# Patient Record
Sex: FEMALE | Race: WHITE | NOT HISPANIC OR LATINO | Employment: STUDENT | URBAN - METROPOLITAN AREA
[De-identification: names, ages, dates, MRNs, and addresses within clinical notes are randomized per-mention and may not be internally consistent; named-entity substitution may affect disease eponyms.]

---

## 2024-11-21 ENCOUNTER — APPOINTMENT (OUTPATIENT)
Dept: OBSTETRICS AND GYNECOLOGY | Facility: CLINIC | Age: 19
End: 2024-11-21
Payer: COMMERCIAL

## 2025-02-08 ENCOUNTER — OFFICE VISIT (OUTPATIENT)
Dept: URGENT CARE | Age: 20
End: 2025-02-08
Payer: COMMERCIAL

## 2025-02-08 VITALS
TEMPERATURE: 98.3 F | HEART RATE: 109 BPM | RESPIRATION RATE: 20 BRPM | WEIGHT: 140 LBS | OXYGEN SATURATION: 99 % | SYSTOLIC BLOOD PRESSURE: 131 MMHG | DIASTOLIC BLOOD PRESSURE: 80 MMHG | BODY MASS INDEX: 25.09 KG/M2

## 2025-02-08 DIAGNOSIS — R30.0 DYSURIA: Primary | ICD-10-CM

## 2025-02-08 DIAGNOSIS — R35.0 URINARY FREQUENCY: ICD-10-CM

## 2025-02-08 LAB
POC APPEARANCE, URINE: ABNORMAL
POC BILIRUBIN, URINE: NEGATIVE
POC BLOOD, URINE: ABNORMAL
POC COLOR, URINE: YELLOW
POC GLUCOSE, URINE: NEGATIVE MG/DL
POC KETONES, URINE: NEGATIVE MG/DL
POC LEUKOCYTES, URINE: ABNORMAL
POC NITRITE,URINE: NEGATIVE
POC PH, URINE: 6 PH
POC PROTEIN, URINE: ABNORMAL MG/DL
POC SPECIFIC GRAVITY, URINE: >=1.03
POC UROBILINOGEN, URINE: 0.2 EU/DL
PREGNANCY TEST URINE, POC: NEGATIVE

## 2025-02-08 RX ORDER — NITROFURANTOIN 25; 75 MG/1; MG/1
100 CAPSULE ORAL 2 TIMES DAILY
Qty: 14 CAPSULE | Refills: 0 | Status: SHIPPED | OUTPATIENT
Start: 2025-02-08 | End: 2025-02-15

## 2025-02-08 ASSESSMENT — ENCOUNTER SYMPTOMS
FREQUENCY: 1
DYSURIA: 1

## 2025-02-08 NOTE — PROGRESS NOTES
Subjective   Patient ID: Sharona Bautista is a 19 y.o. female. They present today with a chief complaint of UTI (Pressure with urination, urgency, frequency for over a week).    History of Present Illness  HPI    Past Medical History  Allergies as of 02/08/2025    (No Known Allergies)       (Not in a hospital admission)       Past Medical History:   Diagnosis Date    Other specified health status     Known health problems: none       Past Surgical History:   Procedure Laterality Date    OTHER SURGICAL HISTORY  10/16/2020    No history of surgery            Review of Systems  Review of Systems   Genitourinary:  Positive for dysuria, frequency and urgency.                                  Objective    Vitals:    02/08/25 0956   BP: 131/80   BP Location: Left arm   Patient Position: Sitting   BP Cuff Size: Large adult   Pulse: 109   Resp: 20   Temp: 36.8 °C (98.3 °F)   TempSrc: Oral   SpO2: 99%   Weight: 63.5 kg (140 lb)     Patient's last menstrual period was 01/20/2025 (approximate).    Physical Exam  Vitals reviewed.   Constitutional:       Appearance: Normal appearance.   HENT:      Head: Normocephalic and atraumatic.      Nose: Nose normal.      Mouth/Throat:      Mouth: Mucous membranes are moist.      Pharynx: Oropharynx is clear.   Eyes:      Extraocular Movements: Extraocular movements intact.      Conjunctiva/sclera: Conjunctivae normal.      Pupils: Pupils are equal, round, and reactive to light.   Cardiovascular:      Rate and Rhythm: Normal rate and regular rhythm.      Pulses: Normal pulses.      Heart sounds: Normal heart sounds.   Pulmonary:      Effort: Pulmonary effort is normal.      Breath sounds: Normal breath sounds.   Abdominal:      General: Abdomen is flat. Bowel sounds are normal.      Palpations: Abdomen is soft.   Musculoskeletal:         General: Normal range of motion.      Cervical back: Normal range of motion.   Skin:     General: Skin is warm.      Capillary Refill: Capillary refill  takes less than 2 seconds.   Neurological:      General: No focal deficit present.      Mental Status: She is alert and oriented to person, place, and time.   Psychiatric:         Mood and Affect: Mood normal.         Behavior: Behavior normal.         Procedures    Point of Care Test & Imaging Results from this visit  No results found for this visit on 02/08/25.   No results found.    Diagnostic study results (if any) were reviewed by LIZA Darling.    Assessment/Plan   Allergies, medications, history, and pertinent labs/EKGs/Imaging reviewed by LIZA Darling.     Medical Decision Making  19-year-old female presents for dysuria urgency frequency 1 week.  Denies fever chills denies back pain denies abdominal pain she reports she has mild dysuria on exam patient is in no acute distress vital signs are stable heart rate is regular lungs are clear bilaterally abdomen is soft nontender bowel sounds are positive there is no CVA tenderness no flank pain patient's UA shows large blood small leukocytes.  Pregnancy negative patient to start Macrobid as directed we will culture urine patient is going to emergency department with any worsening symptoms follow-up with primary care doctor in 1 to 2 days patient agrees with plan of care patient left in stable condition  Orders and Diagnoses  There are no diagnoses linked to this encounter.    Medical Admin Record      Patient disposition: Home    Electronically signed by LIZA Darling  10:02 AM

## 2025-02-08 NOTE — PATIENT INSTRUCTIONS
Start Macrobid as directed, go to emergency department with worsening symptoms, follow up with primary care doctor in 1 to 2 days, increase fluids.

## 2025-02-12 LAB — BACTERIA UR CULT: NORMAL

## 2025-08-25 ENCOUNTER — OFFICE VISIT (OUTPATIENT)
Dept: URGENT CARE | Age: 20
End: 2025-08-25
Payer: COMMERCIAL

## 2025-08-25 VITALS
WEIGHT: 140 LBS | BODY MASS INDEX: 25.09 KG/M2 | TEMPERATURE: 98.1 F | RESPIRATION RATE: 16 BRPM | DIASTOLIC BLOOD PRESSURE: 84 MMHG | SYSTOLIC BLOOD PRESSURE: 136 MMHG | HEART RATE: 66 BPM | OXYGEN SATURATION: 98 %

## 2025-08-25 DIAGNOSIS — L08.9 PIERCED EAR INFECTION, LEFT, INITIAL ENCOUNTER: Primary | ICD-10-CM

## 2025-08-25 DIAGNOSIS — S01.332A PIERCED EAR INFECTION, LEFT, INITIAL ENCOUNTER: Primary | ICD-10-CM

## 2025-08-25 PROCEDURE — 99214 OFFICE O/P EST MOD 30 MIN: CPT

## 2025-08-25 PROCEDURE — 1036F TOBACCO NON-USER: CPT

## 2025-08-25 RX ORDER — SULFAMETHOXAZOLE AND TRIMETHOPRIM 800; 160 MG/1; MG/1
1 TABLET ORAL 2 TIMES DAILY
Qty: 14 TABLET | Refills: 0 | Status: SHIPPED | OUTPATIENT
Start: 2025-08-25 | End: 2025-09-01

## 2025-08-25 RX ORDER — MUPIROCIN 20 MG/G
OINTMENT TOPICAL 2 TIMES DAILY
Qty: 15 G | Refills: 0 | Status: SHIPPED | OUTPATIENT
Start: 2025-08-25 | End: 2025-09-01